# Patient Record
Sex: FEMALE | Race: WHITE | Employment: OTHER | URBAN - METROPOLITAN AREA
[De-identification: names, ages, dates, MRNs, and addresses within clinical notes are randomized per-mention and may not be internally consistent; named-entity substitution may affect disease eponyms.]

---

## 2017-06-20 ENCOUNTER — HOSPITAL ENCOUNTER (OUTPATIENT)
Dept: WOMENS IMAGING | Age: 47
Discharge: HOME | End: 2017-06-20
Attending: INTERNAL MEDICINE | Admitting: INTERNAL MEDICINE

## 2017-06-20 DIAGNOSIS — Z12.31 VISIT FOR SCREENING MAMMOGRAM: ICD-10-CM

## 2019-07-18 ENCOUNTER — HOSPITAL ENCOUNTER (EMERGENCY)
Age: 49
Discharge: HOME | End: 2019-07-18
Attending: EMERGENCY MEDICINE
Payer: COMMERCIAL

## 2019-07-18 ENCOUNTER — APPOINTMENT (OUTPATIENT)
Dept: GENERAL RADIOLOGY | Age: 49
End: 2019-07-18
Payer: COMMERCIAL

## 2019-07-18 VITALS
HEIGHT: 66 IN | HEART RATE: 74 BPM | TEMPERATURE: 98.1 F | RESPIRATION RATE: 16 BRPM | DIASTOLIC BLOOD PRESSURE: 60 MMHG | WEIGHT: 192.9 LBS | BODY MASS INDEX: 31 KG/M2 | SYSTOLIC BLOOD PRESSURE: 108 MMHG | OXYGEN SATURATION: 94 %

## 2019-07-18 DIAGNOSIS — S89.90XA INJURY OF LOWER EXTREMITY, UNSPECIFIED LATERALITY, INITIAL ENCOUNTER: ICD-10-CM

## 2019-07-18 DIAGNOSIS — T14.8XXA ABRASION: Primary | ICD-10-CM

## 2019-07-18 DIAGNOSIS — S89.90XA KNEE INJURY, UNSPECIFIED LATERALITY, INITIAL ENCOUNTER: ICD-10-CM

## 2019-07-18 PROCEDURE — 99283 EMERGENCY DEPT VISIT LOW MDM: CPT

## 2019-07-18 PROCEDURE — 73562 X-RAY EXAM OF KNEE 3: CPT

## 2019-07-18 PROCEDURE — 73610 X-RAY EXAM OF ANKLE: CPT

## 2019-07-18 PROCEDURE — 73630 X-RAY EXAM OF FOOT: CPT

## 2019-07-18 PROCEDURE — 6370000000 HC RX 637 (ALT 250 FOR IP): Performed by: EMERGENCY MEDICINE

## 2019-07-18 RX ORDER — BACITRACIN, NEOMYCIN, POLYMYXIN B 400; 3.5; 5 [USP'U]/G; MG/G; [USP'U]/G
OINTMENT TOPICAL ONCE
Status: COMPLETED | OUTPATIENT
Start: 2019-07-18 | End: 2019-07-18

## 2019-07-18 RX ORDER — CEPHALEXIN 500 MG/1
500 CAPSULE ORAL 3 TIMES DAILY
Qty: 21 CAPSULE | Refills: 0 | Status: SHIPPED | OUTPATIENT
Start: 2019-07-18 | End: 2019-07-25

## 2019-07-18 RX ADMIN — BACITRACIN ZINC, NEOMYCIN SULFATE, AND POLYMYXIN B SULFATE: 400; 3.5; 5 OINTMENT TOPICAL at 10:05

## 2019-07-18 ASSESSMENT — PAIN SCALES - GENERAL
PAINLEVEL_OUTOF10: 4
PAINLEVEL_OUTOF10: 6

## 2019-07-18 ASSESSMENT — PAIN - FUNCTIONAL ASSESSMENT
PAIN_FUNCTIONAL_ASSESSMENT: ACTIVITIES ARE NOT PREVENTED
PAIN_FUNCTIONAL_ASSESSMENT: ACTIVITIES ARE NOT PREVENTED

## 2019-07-18 ASSESSMENT — ENCOUNTER SYMPTOMS
ABDOMINAL DISTENTION: 0
EYE REDNESS: 0
PHOTOPHOBIA: 0
EYE ITCHING: 0
COUGH: 0
EYE DISCHARGE: 0
APNEA: 0
CHOKING: 0
STRIDOR: 0
WHEEZING: 0
SHORTNESS OF BREATH: 0
CHEST TIGHTNESS: 0
EYE PAIN: 0

## 2019-07-18 ASSESSMENT — PAIN DESCRIPTION - PAIN TYPE
TYPE: ACUTE PAIN
TYPE: ACUTE PAIN

## 2019-07-18 ASSESSMENT — PAIN DESCRIPTION - ORIENTATION
ORIENTATION: LEFT
ORIENTATION: LEFT

## 2019-07-18 ASSESSMENT — PAIN DESCRIPTION - LOCATION
LOCATION: LEG
LOCATION: LEG

## 2019-07-18 ASSESSMENT — PAIN DESCRIPTION - ONSET
ONSET: ON-GOING
ONSET: ON-GOING

## 2019-07-18 ASSESSMENT — PAIN DESCRIPTION - DESCRIPTORS
DESCRIPTORS: BURNING
DESCRIPTORS: BURNING

## 2019-07-18 ASSESSMENT — PAIN DESCRIPTION - PROGRESSION
CLINICAL_PROGRESSION: GRADUALLY WORSENING
CLINICAL_PROGRESSION: GRADUALLY WORSENING

## 2019-07-18 ASSESSMENT — PAIN DESCRIPTION - FREQUENCY: FREQUENCY: CONTINUOUS

## 2019-07-18 NOTE — ED NOTES
D/C instructions, including RX and follow up care given to pt. Pt verbalized understanding and denies further questions or needs at this time. Ambulatory to waiting room with steady gait. NAD. Donis Lombard, RN  07/18/19 1016

## 2019-07-30 ENCOUNTER — HOSPITAL ENCOUNTER (OUTPATIENT)
Dept: WOUND CARE | Age: 49
Discharge: HOME | End: 2019-07-30
Payer: COMMERCIAL

## 2019-07-30 VITALS
SYSTOLIC BLOOD PRESSURE: 112 MMHG | RESPIRATION RATE: 16 BRPM | BODY MASS INDEX: 30.82 KG/M2 | TEMPERATURE: 97.8 F | DIASTOLIC BLOOD PRESSURE: 76 MMHG | HEIGHT: 66 IN | HEART RATE: 65 BPM | WEIGHT: 191.8 LBS

## 2019-07-30 DIAGNOSIS — S81.002A UNSPECIFIED OPEN WOUND, LEFT KNEE, INITIAL ENCOUNTER: ICD-10-CM

## 2019-07-30 PROCEDURE — 99214 OFFICE O/P EST MOD 30 MIN: CPT

## 2019-07-30 PROCEDURE — 99203 OFFICE O/P NEW LOW 30 MIN: CPT | Performed by: NURSE PRACTITIONER

## 2019-07-30 RX ORDER — LIDOCAINE 50 MG/G
OINTMENT TOPICAL PRN
Status: DISCONTINUED | OUTPATIENT
Start: 2019-07-30 | End: 2019-07-31 | Stop reason: HOSPADM

## 2019-07-30 RX ORDER — FLUOXETINE HYDROCHLORIDE 20 MG/1
20 CAPSULE ORAL DAILY
COMMUNITY

## 2019-07-30 RX ORDER — ALBUTEROL SULFATE 90 UG/1
2 AEROSOL, METERED RESPIRATORY (INHALATION) EVERY 6 HOURS PRN
COMMUNITY

## 2019-07-30 RX ORDER — COVID-19 ANTIGEN TEST
KIT MISCELLANEOUS PRN
COMMUNITY

## 2019-07-30 RX ORDER — IBUPROFEN 200 MG
200 TABLET ORAL EVERY 6 HOURS PRN
COMMUNITY

## 2019-07-30 RX ORDER — MONTELUKAST SODIUM 10 MG/1
10 TABLET ORAL NIGHTLY
COMMUNITY

## 2019-07-30 RX ORDER — CLONAZEPAM 0.5 MG/1
0.5 TABLET ORAL DAILY PRN
COMMUNITY

## 2019-07-30 ASSESSMENT — PAIN - FUNCTIONAL ASSESSMENT
PAIN_FUNCTIONAL_ASSESSMENT: PREVENTS OR INTERFERES SOME ACTIVE ACTIVITIES AND ADLS
PAIN_FUNCTIONAL_ASSESSMENT: PREVENTS OR INTERFERES WITH MANY ACTIVE NOT PASSIVE ACTIVITIES

## 2019-07-30 ASSESSMENT — PAIN DESCRIPTION - FREQUENCY
FREQUENCY: INTERMITTENT
FREQUENCY: INTERMITTENT

## 2019-07-30 ASSESSMENT — PAIN DESCRIPTION - PAIN TYPE
TYPE: ACUTE PAIN
TYPE: ACUTE PAIN

## 2019-07-30 ASSESSMENT — PAIN SCALES - GENERAL
PAINLEVEL_OUTOF10: 3
PAINLEVEL_OUTOF10: 1

## 2019-07-30 ASSESSMENT — PAIN DESCRIPTION - ONSET
ONSET: ON-GOING
ONSET: ON-GOING

## 2019-07-30 ASSESSMENT — PAIN DESCRIPTION - DESCRIPTORS
DESCRIPTORS: BURNING
DESCRIPTORS: BURNING

## 2019-07-30 ASSESSMENT — PAIN DESCRIPTION - ORIENTATION
ORIENTATION: LEFT
ORIENTATION: LEFT

## 2019-07-30 ASSESSMENT — PAIN DESCRIPTION - LOCATION
LOCATION: FOOT
LOCATION: FOOT

## 2019-07-30 ASSESSMENT — PAIN DESCRIPTION - PROGRESSION
CLINICAL_PROGRESSION: NOT CHANGED
CLINICAL_PROGRESSION: NOT CHANGED

## 2019-07-31 PROBLEM — S81.002A UNSPECIFIED OPEN WOUND, LEFT KNEE, INITIAL ENCOUNTER: Status: ACTIVE | Noted: 2019-07-31

## 2019-07-31 NOTE — PROGRESS NOTES
Assessment Painful;Pink 7/30/2019  8:34 AM   Non-staged Wound Description Full thickness 7/30/2019  8:34 AM   Other%Wound Bed 100 7/30/2019  8:34 AM   Number of days: 1          Plan:   Pt education per provider related to concept of moist wound healing and the goals of treatment with using Medihoney for next week. Pt is in agreement with plan of care and questions answered. Treatment Note please see attached Discharge Instructions    Written patient dismissal instructions given to patient and signed by patient or POA. Discharge [address] and Hyperbaric Oxygen Therapy   Physician Orders and Discharge Instructions  [facility]  [address], [address]  Telephone: [phone]    NAME:  Sissy Jacques  YOB: 1970  MEDICAL RECORD NUMBER:  [id number]  DATE:  7/30/2019    Wound Cleansing:   Do not scrub or use excessive force. Cleanse wound prior to applying a clean dressing with:  [x] Normal Saline [x] Keep Wound Dry in Shower            Topical Treatments:  Do not apply lotions, creams, or ointments to wound bed unless directed. [] Apply moisturizing lotion to skin surrounding the wound prior to dressing change.  [] Apply antifungal ointment to skin surrounding the wound prior to dressing change.  [] Apply thin film of moisture barrier ointment to skin immediately around wound. [] Other:       Dressings:           Wound Location  LEFT KNEE/LEFT DORSAL FOOT      [x] Apply Primary Dressing:       [x] MediHoney Gel        ** SANTYL TODAY IN CLINIC ONLY**    [x] Cover and Secure with:     [x] Gauze [x] Sandra: TO FOOT WOUNDS    [X] COVER ROLL  TO LEFT KNEE     Avoid contact of tape with skin.     [x] Change dressing: [x] Daily           Edema Control:  Apply: [] Compression Stocking []Right Leg []Left Leg   [] Tubigrip []Right Leg Double Layer []Left Leg Double Layer

## 2019-08-05 ENCOUNTER — HOSPITAL ENCOUNTER (OUTPATIENT)
Dept: WOUND CARE | Age: 49
Discharge: HOME | End: 2019-08-05
Payer: COMMERCIAL

## 2019-08-05 VITALS
RESPIRATION RATE: 18 BRPM | TEMPERATURE: 97.6 F | HEART RATE: 100 BPM | SYSTOLIC BLOOD PRESSURE: 105 MMHG | DIASTOLIC BLOOD PRESSURE: 72 MMHG

## 2019-08-05 DIAGNOSIS — S91.302D UNSPECIFIED OPEN WOUND, LEFT FOOT, SUBSEQUENT ENCOUNTER: ICD-10-CM

## 2019-08-05 DIAGNOSIS — S81.802D OPEN WOUND, LOWER LEG, LEFT, SUBSEQUENT ENCOUNTER: ICD-10-CM

## 2019-08-05 DIAGNOSIS — S81.002D UNSPECIFIED OPEN WOUND, LEFT KNEE, SUBSEQUENT ENCOUNTER: Primary | ICD-10-CM

## 2019-08-05 PROCEDURE — 11042 DBRDMT SUBQ TIS 1ST 20SQCM/<: CPT

## 2019-08-05 PROCEDURE — 11042 DBRDMT SUBQ TIS 1ST 20SQCM/<: CPT | Performed by: NURSE PRACTITIONER

## 2019-08-05 PROCEDURE — 11045 DBRDMT SUBQ TISS EACH ADDL: CPT

## 2019-08-05 PROCEDURE — 11045 DBRDMT SUBQ TISS EACH ADDL: CPT | Performed by: NURSE PRACTITIONER

## 2019-08-05 ASSESSMENT — PAIN DESCRIPTION - ONSET
ONSET: PROGRESSIVE
ONSET: ON-GOING

## 2019-08-05 ASSESSMENT — PAIN DESCRIPTION - FREQUENCY
FREQUENCY: INTERMITTENT
FREQUENCY: INTERMITTENT

## 2019-08-05 ASSESSMENT — PAIN DESCRIPTION - LOCATION
LOCATION: FOOT
LOCATION: FOOT

## 2019-08-05 ASSESSMENT — PAIN DESCRIPTION - PAIN TYPE
TYPE: ACUTE PAIN
TYPE: ACUTE PAIN

## 2019-08-05 ASSESSMENT — PAIN DESCRIPTION - PROGRESSION
CLINICAL_PROGRESSION: NOT CHANGED
CLINICAL_PROGRESSION: NOT CHANGED

## 2019-08-05 ASSESSMENT — PAIN DESCRIPTION - ORIENTATION
ORIENTATION: LEFT
ORIENTATION: LEFT

## 2019-08-05 ASSESSMENT — PAIN DESCRIPTION - DESCRIPTORS
DESCRIPTORS: BURNING
DESCRIPTORS: BURNING

## 2019-08-05 ASSESSMENT — PAIN SCALES - GENERAL
PAINLEVEL_OUTOF10: 5
PAINLEVEL_OUTOF10: 4

## 2019-08-05 ASSESSMENT — PAIN - FUNCTIONAL ASSESSMENT
PAIN_FUNCTIONAL_ASSESSMENT: PREVENTS OR INTERFERES SOME ACTIVE ACTIVITIES AND ADLS
PAIN_FUNCTIONAL_ASSESSMENT: PREVENTS OR INTERFERES SOME ACTIVE ACTIVITIES AND ADLS

## 2019-08-06 RX ORDER — LIDOCAINE HYDROCHLORIDE 40 MG/ML
SOLUTION TOPICAL ONCE
Status: DISCONTINUED | OUTPATIENT
Start: 2019-08-06 | End: 2019-08-19

## 2019-08-07 PROBLEM — S81.002D: Status: ACTIVE | Noted: 2019-07-31

## 2019-08-07 PROBLEM — S81.802D OPEN WOUND, LOWER LEG, LEFT, SUBSEQUENT ENCOUNTER: Status: ACTIVE | Noted: 2019-08-07

## 2019-08-07 PROBLEM — S91.302D UNSPECIFIED OPEN WOUND, LEFT FOOT, SUBSEQUENT ENCOUNTER: Status: ACTIVE | Noted: 2019-08-07

## 2019-08-07 NOTE — PROGRESS NOTES
2005    CYST REMOVAL      EYE SURGERY  EYELID LIFT    HYSTERECTOMY  2011    KNEE ARTHROSCOPY Left [id number]     ARTHROSCOPIC LEFT KNEE ANTERIOR CRUCIATE LIGAMENT    SHOULDER ARTHROSCOPY Left FROZEN    SHOULDER SURGERY         FAMILY HISTORY    Family History   Problem Relation Age of Onset    Diabetes Father     Osteoarthritis Father     High Blood Pressure Father     High Cholesterol Father     Coronary Art Dis Father     Cancer Father         liver    Depression Father     Arthritis Father     Osteoarthritis Mother     Diabetes Mother     Depression Mother     Arthritis Mother     Seizures Other         Son    Diabetes Maternal Grandmother     Coronary Art Dis Maternal Grandfather     Diabetes Maternal Grandfather        SOCIAL HISTORY    Social History     Tobacco Use    Smoking status: Never Smoker    Smokeless tobacco: Never Used   Substance Use Topics    Alcohol use: Yes     Alcohol/week: 3.0 standard drinks     Types: 3 Cans of beer per week     Comment: RARELY    Drug use: No       ALLERGIES    Allergies   Allergen Reactions    Codeine Other (See Comments)     Sharp abdominal pain    Hydrocodone-Acetaminophen      Causes pt severe abd pain    Oxycodone-Acetaminophen Other (See Comments)     Sharp abdominal pain    Clindamycin/Lincomycin Rash    Doxycycline Rash       MEDICATIONS    Current Outpatient Medications on File Prior to Encounter   Medication Sig Dispense Refill    FLUoxetine (PROZAC) 20 MG capsule Take 20 mg by mouth daily 2 capsules      clonazePAM (KLONOPIN) 0.5 MG tablet Take 0.5 mg by mouth daily as needed.       ibuprofen (ADVIL;MOTRIN) 200 MG tablet Take 200 mg by mouth every 6 hours as needed for Pain      Naproxen Sodium (ALEVE) 220 MG CAPS Take by mouth as needed for Pain      Wound Dressings ([facility] WOUND/BURN DRESSING) GEL gel Apply 1 each topically daily 1 Tube 0    albuterol sulfate  (90 Base) MCG/ACT inhaler Inhale 2 puffs into the lungs necrotic/eschar    Pre Debridement Measurements:  Are located in the [place]  Documentation Flow Sheet+    Wound/Ulcer #: 1, 2, 3 and 4    Post Debridement Measurements:  Wound/Ulcer Descriptions are Pre Debridement except measurements:    Incision 10/14/13 Knee Left (Active)   Number of days: 2122       Wound 07/30/19 Knee Left #1 - injury 7/18/2019 (Active)   Wound Image   8/5/2019  5:18 PM   Wound Traumatic 7/30/2019  8:34 AM   Dressing Status Old drainage 8/5/2019  4:16 PM   Dressing Changed Changed/New 8/5/2019  5:18 PM   Dressing/Treatment Collagen;Dry Dressing 8/5/2019  5:18 PM   Wound Length (cm) 5.6 cm 8/5/2019  4:16 PM   Wound Width (cm) 4.1 cm 8/5/2019  4:16 PM   Wound Depth (cm) 0.1 cm 8/5/2019  4:16 PM   Wound Surface Area (cm^2) 22.96 cm^2 8/5/2019  4:16 PM   Change in Wound Size % (l*w) -4.36 8/5/2019  4:16 PM   Wound Volume (cm^3) 2.3 cm^3 8/5/2019  4:16 PM   Wound Healing % -5 8/5/2019  4:16 PM   Post-Procedure Length (cm) 5.6 cm 8/5/2019  5:09 PM   Post-Procedure Width (cm) 4.1 cm 8/5/2019  5:09 PM   Post-Procedure Depth (cm) 0.1 cm 8/5/2019  5:09 PM   Post-Procedure Surface Area (cm^2) 22.96 cm^2 8/5/2019  5:09 PM   Post-Procedure Volume (cm^3) 2.3 cm^3 8/5/2019  5:09 PM   Wound Assessment Dry;Brown;Granulation tissue 8/5/2019  4:16 PM   Drainage Amount Small 8/5/2019  4:16 PM   Drainage Description Serosanguinous 8/5/2019  4:16 PM   Odor None 8/5/2019  4:16 PM   Margins Undefined edges 8/5/2019  4:16 PM   Luciana-wound Assessment Pink;Dry 8/5/2019  4:16 PM   Non-staged Wound Description Full thickness 8/5/2019  4:16 PM   [place]%Wound Bed 5 8/5/2019  4:16 PM   Other%Wound Bed 95 8/5/2019  4:16 PM   Number of days: 8       Wound 07/30/19 Foot Left;Dorsal;Proximal #2 - injury 7/18/2019 (Active)   Wound Image   8/5/2019  5:18 PM   Wound Traumatic 7/30/2019  8:34 AM   Dressing Status Old drainage 8/5/2019  4:16 PM   Dressing Changed Changed/New 8/5/2019  5:18 PM   Dressing/Treatment Collagen;Dry

## 2019-08-12 ENCOUNTER — HOSPITAL ENCOUNTER (OUTPATIENT)
Dept: WOUND CARE | Age: 49
Discharge: HOME | End: 2019-08-12
Payer: COMMERCIAL

## 2019-08-12 VITALS — TEMPERATURE: 98.1 F | DIASTOLIC BLOOD PRESSURE: 85 MMHG | SYSTOLIC BLOOD PRESSURE: 123 MMHG | HEART RATE: 70 BPM

## 2019-08-12 DIAGNOSIS — S81.802D OPEN WOUND, LOWER LEG, LEFT, SUBSEQUENT ENCOUNTER: Primary | ICD-10-CM

## 2019-08-12 PROCEDURE — 99212 OFFICE O/P EST SF 10 MIN: CPT | Performed by: NURSE PRACTITIONER

## 2019-08-12 PROCEDURE — 99212 OFFICE O/P EST SF 10 MIN: CPT

## 2019-08-12 RX ORDER — BETAMETHASONE DIPROPIONATE 0.05 %
OINTMENT (GRAM) TOPICAL ONCE
Status: CANCELLED | OUTPATIENT
Start: 2019-08-12

## 2019-08-12 RX ORDER — GENTAMICIN SULFATE 1 MG/G
OINTMENT TOPICAL ONCE
Status: CANCELLED | OUTPATIENT
Start: 2019-08-12

## 2019-08-12 RX ORDER — BACITRACIN, NEOMYCIN, POLYMYXIN B 400; 3.5; 5 [USP'U]/G; MG/G; [USP'U]/G
OINTMENT TOPICAL ONCE
Status: CANCELLED | OUTPATIENT
Start: 2019-08-12

## 2019-08-12 RX ORDER — GINSENG 100 MG
CAPSULE ORAL ONCE
Status: CANCELLED | OUTPATIENT
Start: 2019-08-12

## 2019-08-12 RX ORDER — BACITRACIN ZINC AND POLYMYXIN B SULFATE 500; 1000 [USP'U]/G; [USP'U]/G
OINTMENT TOPICAL ONCE
Status: CANCELLED | OUTPATIENT
Start: 2019-08-12

## 2019-08-12 RX ORDER — CLOBETASOL PROPIONATE 0.5 MG/G
OINTMENT TOPICAL ONCE
Status: CANCELLED | OUTPATIENT
Start: 2019-08-12

## 2019-08-12 ASSESSMENT — PAIN SCALES - GENERAL: PAINLEVEL_OUTOF10: 0

## 2019-08-14 NOTE — PROGRESS NOTES
3:29 PM   Wound Depth (cm) 0.1 cm 8/12/2019  3:29 PM   Wound Surface Area (cm^2) 0.55 cm^2 8/12/2019  3:29 PM   Change in Wound Size % (l*w) 84.38 8/12/2019  3:29 PM   Wound Volume (cm^3) 0.06 cm^3 8/12/2019  3:29 PM   Wound Healing % 83 8/12/2019  3:29 PM   Post-Procedure Length (cm) 0.6 cm 8/12/2019  3:46 PM   Post-Procedure Width (cm) 1.2 cm 8/12/2019  3:46 PM   Post-Procedure Depth (cm) 0.1 cm 8/12/2019  3:46 PM   Post-Procedure Surface Area (cm^2) 0.72 cm^2 8/12/2019  3:46 PM   Post-Procedure Volume (cm^3) 0.07 cm^3 8/12/2019  3:46 PM   Wound Assessment Black 8/12/2019  3:29 PM   Drainage Amount None 8/12/2019  3:29 PM   Drainage Description Serosanguinous 8/5/2019  4:16 PM   Odor None 8/12/2019  3:29 PM   Margins Undefined edges 8/12/2019  3:29 PM   Luciana-wound Assessment Dry 8/12/2019  3:29 PM   Non-staged Wound Description Full thickness 8/12/2019  3:29 PM   [place]%Wound Bed 20 8/5/2019  4:16 PM   Black%Wound Bed 100 8/12/2019  3:29 PM   Other%Wound Bed 80 8/5/2019  4:16 PM   Number of days: 15          Plan:   Pt education per provider about care plan for last remaining wound. Pt agreeable to plan of care and questions answered. Treatment Note please see attached Discharge Instructions    Written patient dismissal instructions given to patient and signed by patient or POA. Discharge Instructions         Discharge Instructions               [address] and Hyperbaric Oxygen Therapy   Physician Orders and Discharge [address]  [address] [facility] [address], [facility] [address]  Telephone: [phone]      FAX [phone]     NAME: Adry Jacques  DATE OF BIRTH:  1970  MEDICAL RECORD NUMBER:  [id number]  DATE:  8/12/2019     Wound Cleansing:   Do not scrub or use excessive force.   Cleanse wound prior to applying a clean dressing with:  [x] Normal Saline            [x] Keep Wound Dry in Shower            Topical

## 2019-08-19 ENCOUNTER — HOSPITAL ENCOUNTER (OUTPATIENT)
Dept: WOUND CARE | Age: 49
Discharge: HOME | End: 2019-08-19
Payer: COMMERCIAL

## 2019-08-19 VITALS
DIASTOLIC BLOOD PRESSURE: 85 MMHG | RESPIRATION RATE: 18 BRPM | HEART RATE: 69 BPM | TEMPERATURE: 97.9 F | SYSTOLIC BLOOD PRESSURE: 129 MMHG

## 2019-08-19 DIAGNOSIS — S81.802D OPEN WOUND, LOWER LEG, LEFT, SUBSEQUENT ENCOUNTER: Primary | ICD-10-CM

## 2019-08-19 PROCEDURE — 99211 OFF/OP EST MAY X REQ PHY/QHP: CPT

## 2019-08-19 PROCEDURE — 99211 OFF/OP EST MAY X REQ PHY/QHP: CPT | Performed by: NURSE PRACTITIONER

## 2019-08-19 RX ORDER — LIDOCAINE HYDROCHLORIDE 40 MG/ML
SOLUTION TOPICAL ONCE
Status: DISCONTINUED | OUTPATIENT
Start: 2019-08-19 | End: 2019-08-20 | Stop reason: HOSPADM

## 2019-08-19 ASSESSMENT — PAIN SCALES - GENERAL: PAINLEVEL_OUTOF10: 0
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
## 2020-01-14 ENCOUNTER — HOSPITAL ENCOUNTER (OUTPATIENT)
Dept: WOMENS IMAGING | Age: 50
Discharge: HOME | End: 2020-01-14
Payer: COMMERCIAL

## 2020-01-14 PROCEDURE — 77063 BREAST TOMOSYNTHESIS BI: CPT

## 2020-10-06 ENCOUNTER — HOSPITAL ENCOUNTER (EMERGENCY)
Age: 50
Discharge: HOME | End: 2020-10-06
Attending: EMERGENCY MEDICINE
Payer: COMMERCIAL

## 2020-10-06 ENCOUNTER — APPOINTMENT (OUTPATIENT)
Dept: GENERAL RADIOLOGY | Age: 50
End: 2020-10-06
Payer: COMMERCIAL

## 2020-10-06 VITALS
SYSTOLIC BLOOD PRESSURE: 122 MMHG | BODY MASS INDEX: 30.65 KG/M2 | WEIGHT: 190.7 LBS | RESPIRATION RATE: 17 BRPM | OXYGEN SATURATION: 100 % | HEART RATE: 77 BPM | DIASTOLIC BLOOD PRESSURE: 81 MMHG | HEIGHT: 66 IN | TEMPERATURE: 97.7 F

## 2020-10-06 PROCEDURE — 6360000002 HC RX W HCPCS: Performed by: EMERGENCY MEDICINE

## 2020-10-06 PROCEDURE — 73140 X-RAY EXAM OF FINGER(S): CPT

## 2020-10-06 PROCEDURE — 99283 EMERGENCY DEPT VISIT LOW MDM: CPT

## 2020-10-06 PROCEDURE — 90715 TDAP VACCINE 7 YRS/> IM: CPT | Performed by: EMERGENCY MEDICINE

## 2020-10-06 PROCEDURE — 6370000000 HC RX 637 (ALT 250 FOR IP): Performed by: EMERGENCY MEDICINE

## 2020-10-06 PROCEDURE — 12002 RPR S/N/AX/GEN/TRNK2.6-7.5CM: CPT

## 2020-10-06 PROCEDURE — 90471 IMMUNIZATION ADMIN: CPT | Performed by: EMERGENCY MEDICINE

## 2020-10-06 PROCEDURE — 2500000003 HC RX 250 WO HCPCS: Performed by: EMERGENCY MEDICINE

## 2020-10-06 RX ORDER — CEPHALEXIN 500 MG/1
500 CAPSULE ORAL ONCE
Status: COMPLETED | OUTPATIENT
Start: 2020-10-06 | End: 2020-10-06

## 2020-10-06 RX ORDER — CEPHALEXIN 500 MG/1
500 CAPSULE ORAL 4 TIMES DAILY
Qty: 40 CAPSULE | Refills: 0 | Status: SHIPPED | OUTPATIENT
Start: 2020-10-06 | End: 2020-10-16

## 2020-10-06 RX ORDER — BACITRACIN, NEOMYCIN, POLYMYXIN B 400; 3.5; 5 [USP'U]/G; MG/G; [USP'U]/G
OINTMENT TOPICAL ONCE
Status: COMPLETED | OUTPATIENT
Start: 2020-10-06 | End: 2020-10-06

## 2020-10-06 RX ORDER — LIDOCAINE HYDROCHLORIDE 10 MG/ML
5 INJECTION, SOLUTION INFILTRATION; PERINEURAL ONCE
Status: COMPLETED | OUTPATIENT
Start: 2020-10-06 | End: 2020-10-06

## 2020-10-06 RX ORDER — HYDROCODONE BITARTRATE AND ACETAMINOPHEN 5; 325 MG/1; MG/1
1 TABLET ORAL EVERY 4 HOURS PRN
Qty: 18 TABLET | Refills: 0 | Status: SHIPPED | OUTPATIENT
Start: 2020-10-06 | End: 2020-10-09

## 2020-10-06 RX ADMIN — TETANUS TOXOID, REDUCED DIPHTHERIA TOXOID AND ACELLULAR PERTUSSIS VACCINE, ADSORBED 0.5 ML: 5; 2.5; 8; 8; 2.5 SUSPENSION INTRAMUSCULAR at 15:11

## 2020-10-06 RX ADMIN — LIDOCAINE HYDROCHLORIDE 5 ML: 10 INJECTION, SOLUTION INFILTRATION; PERINEURAL at 16:00

## 2020-10-06 RX ADMIN — CEPHALEXIN 500 MG: 500 CAPSULE ORAL at 15:13

## 2020-10-06 RX ADMIN — BACITRACIN ZINC, NEOMYCIN SULFATE, AND POLYMYXIN B SULFATE: 400; 3.5; 5 OINTMENT TOPICAL at 16:32

## 2020-10-06 ASSESSMENT — PAIN SCALES - GENERAL
PAINLEVEL_OUTOF10: 7
PAINLEVEL_OUTOF10: 7

## 2020-10-06 ASSESSMENT — PAIN DESCRIPTION - DESCRIPTORS: DESCRIPTORS: THROBBING

## 2020-10-06 ASSESSMENT — PAIN DESCRIPTION - LOCATION: LOCATION: FINGER (COMMENT WHICH ONE)

## 2020-10-06 NOTE — ED TRIAGE NOTES
Pt was trimming headges and accidentally cut left middle finger with electric hedge trimmers.  Pt not on blood thinners, bleeding controlled at this time,

## 2020-10-06 NOTE — ED PROVIDER NOTES
[address] [facility]      Pt Name: Emily Barrientos  MRN: [id number]  [place] 1970  Date of evaluation: 10/6/2020  Provider: Osman Seay [address] [place] [address]  Chief Complaint   Patient presents with    Laceration     left third finger cut with electric hedge trimmers       I wore personal protective equipment when I was in the room the entire time. This includes gloves, N95 mask, face shield, and a glove over my stethoscope for protection. HPI  Emily Barrientos is a 44 y.o. female who presents with laceration the tip of her left middle finger. She is left-handed. She states it occurred when she was working with a hedge tremor that would not shut off unless she unplugged it. It caught her distal left middle finger. She sustained a laceration at that time. She denies any other injuries. She states her tetanus is not up-to-date. She states that is not numb and that hurts. She denies any radiation of her pain. REVIEW OF SYSTEMS  All systems negative except as noted in the HPI. Reviewed Nurses' notes and concur. No LMP recorded. Patient has had a hysterectomy.     PAST MEDICAL HISTORY  Past Medical History:   Diagnosis Date    Anxiety     Arthritis     Asthma     Depression     DVT of lower extremity (deep venous thrombosis) [facility]) 14 Nov 2013    Fibromyalgia     Fibromyalgia     Gastroesophageal reflux 2004    History of migraine headaches     Hx of blood clots     Low back pain     Neck pain     Thyroid disease     Hashimoto's Disease       FAMILY HISTORY  Family History   Problem Relation Age of Onset    Diabetes Father     Osteoarthritis Father     High Blood Pressure Father     High Cholesterol Father     Coronary Art Dis Father     Cancer Father         liver    Depression Father     Arthritis Father     Osteoarthritis Mother     Diabetes Mother     Depression Mother     Arthritis Mother  Seizures Other         Son    Diabetes Maternal Grandmother     Coronary Art Dis Maternal Grandfather     Diabetes Maternal Grandfather        SOCIAL HISTORY   reports that she has never smoked. She has never used smokeless tobacco. She reports current alcohol use of about 3.0 standard drinks of alcohol per week. She reports that she does not use drugs. SURGICAL HISTORY  Past Surgical History:   Procedure Laterality Date    ANTERIOR CRUCIATE LIGAMENT REPAIR  2013    CERVICAL [address] SURGERY      CHOLECYSTECTOMY  2005    CYST REMOVAL      EYE SURGERY  EYELID LIFT    HYSTERECTOMY  2011    KNEE ARTHROSCOPY Left [id number]     ARTHROSCOPIC LEFT KNEE ANTERIOR CRUCIATE LIGAMENT    SHOULDER ARTHROSCOPY Left FROZEN    SHOULDER SURGERY         CURRENT MEDICATIONS  Current Outpatient Rx   Medication Sig Dispense Refill    cephALEXin (KEFLEX) 500 MG capsule Take 1 capsule by mouth 4 times daily for 10 days 40 capsule 0    HYDROcodone-acetaminophen (NORCO) 5-325 MG per tablet Take 1 tablet by mouth every 4 hours as needed for Pain for up to 3 days. Intended supply: 3 days. Take lowest dose possible to manage pain 18 tablet 0    Pseudoephedrine HCl 30 MG/5ML LIQD Take by mouth      FLUoxetine (PROZAC) 20 MG capsule Take 20 mg by mouth daily 2 capsules      albuterol sulfate  (90 Base) MCG/ACT inhaler Inhale 2 puffs into the lungs every 6 hours as needed for Wheezing      montelukast (SINGULAIR) 10 MG tablet Take 10 mg by mouth nightly      clonazePAM (KLONOPIN) 0.5 MG tablet Take 0.5 mg by mouth daily as needed.       ibuprofen (ADVIL;MOTRIN) 200 MG tablet Take 200 mg by mouth every 6 hours as needed for Pain      Naproxen Sodium (ALEVE) 220 MG CAPS Take by mouth as needed for Pain      Wound Dressings ([facility] WOUND/BURN DRESSING) GEL gel Apply 1 each topically daily 1 Tube 0       ALLERGIES  Allergies   Allergen Reactions    Codeine Other (See Comments)     Sharp abdominal pain    department. She was instructed to continue that at home. She was instructed return if she found any signs of infection. Patient was given laceration instructions. She was instructed not to start changing bandages for least 2 days. The patient's blood pressure was found to be elevated according to CMS/Medicare and the Affordable Care Act/[facility] criteria. Elevated blood pressure could occur because of pain or anxiety or other reasons and does not mean that they need to have their blood pressure treated or medications otherwise adjusted. However, this could also be a sign that they will need to have their blood pressure treated or medications changed. The patient was instructed to follow up closely with their personal physician to have their blood pressure rechecked. The patient was instructed to take a list of recent blood pressure readings to their next visit with their personal physician. See discharge instructions for specific medications, discharge information, and treatments. They were verbally instructed to return to emergency if any problems. (This chart has been completed using [address]. Although attempts have been made to ensure accuracy, words and/or phrases may not be transcribed as intended.)    Patient refused pain medicines at the time of their exam.    IMPRESSION(S):  1. Open displaced fracture of distal phalanx of left middle finger, initial encounter        Diagnostic considerations include but are not limited to: Arterial Injury/Ischemia, Fracture, Dislocation, Infection, Neurologic Deficit/Injury.            Rafaela De Los Santos DO  10/06/20 0120

## 2020-10-07 ENCOUNTER — OFFICE VISIT (OUTPATIENT)
Dept: ORTHOPEDIC SURGERY | Age: 50
End: 2020-10-07
Payer: COMMERCIAL

## 2020-10-07 PROBLEM — S62.633B OPEN DISPLACED FRACTURE OF DISTAL PHALANX OF LEFT MIDDLE FINGER: Status: ACTIVE | Noted: 2020-10-07

## 2020-10-07 PROCEDURE — G8417 CALC BMI ABV UP PARAM F/U: HCPCS | Performed by: ORTHOPAEDIC SURGERY

## 2020-10-07 PROCEDURE — 26750 TREAT FINGER FRACTURE EACH: CPT | Performed by: ORTHOPAEDIC SURGERY

## 2020-10-07 PROCEDURE — G8427 DOCREV CUR MEDS BY ELIG CLIN: HCPCS | Performed by: ORTHOPAEDIC SURGERY

## 2020-10-07 PROCEDURE — 99203 OFFICE O/P NEW LOW 30 MIN: CPT | Performed by: ORTHOPAEDIC SURGERY

## 2020-10-07 PROCEDURE — 3017F COLORECTAL CA SCREEN DOC REV: CPT | Performed by: ORTHOPAEDIC SURGERY

## 2020-10-07 PROCEDURE — G8484 FLU IMMUNIZE NO ADMIN: HCPCS | Performed by: ORTHOPAEDIC SURGERY

## 2020-10-07 PROCEDURE — 1036F TOBACCO NON-USER: CPT | Performed by: ORTHOPAEDIC SURGERY

## 2020-10-07 NOTE — PROGRESS NOTES
CHIEF COMPLAINT: Left hand pain/ long (middle) finger distal phalanx open fracture. DATE OF INJURY: 10/6/2020    HISTORY:  Ms. Ronnell Gallo 44 y.o.  female left handed presents today for the first visit for evaluation of a left hand injury which occurred when she was getting a friends bushes with a hedge tremors and accidentally cut her finger. She is complaining of long (middle) finger pain and swelling. This is better with elevation and worse with ROM. The pain is sharp and not radiating. No other complaint. She rates her pain a 8/10 VAS. She was seen at [facility] ER, was evaluated, started on Keflex, sutured and splinted and asked to see orthopedics. Denies smoking.     Past Medical History:   Diagnosis Date    Anxiety     Arthritis     Asthma     Depression     DVT of lower extremity (deep venous thrombosis) [facility]) 14 Nov 2013    Fibromyalgia     Fibromyalgia     Gastroesophageal reflux 2004    History of migraine headaches     Hx of blood clots     Low back pain     Neck pain     Thyroid disease     Hashimoto's Disease       Past Surgical History:   Procedure Laterality Date    ANTERIOR CRUCIATE LIGAMENT REPAIR  2013    CERVICAL [address] SURGERY      CHOLECYSTECTOMY  2005    CYST REMOVAL      EYE SURGERY  EYELID LIFT    HYSTERECTOMY  2011    KNEE ARTHROSCOPY Left [id number]     ARTHROSCOPIC LEFT KNEE ANTERIOR CRUCIATE LIGAMENT    SHOULDER ARTHROSCOPY Left FROZEN    SHOULDER SURGERY         Social History     Socioeconomic History    Marital status:      Spouse name: Not on file    Number of children: 1    Years of education: Not on file    Highest education level: Not on file   Occupational History    Not on file   Social Needs    Financial resource strain: Not on file    Food insecurity     Worry: Not on file     Inability: Not on file    Transportation needs     Medical: Not on file     Non-medical: Not on file   Tobacco Use    Smoking status: Never Smoker    Smokeless tobacco: Never Used   Substance and Sexual Activity    Alcohol use: Yes     Alcohol/week: 3.0 standard drinks     Types: 3 Cans of beer per week     Comment: RARELY    Drug use: No    Sexual activity: Not on file   Lifestyle    Physical activity     Days per week: Not on file     Minutes per session: Not on file    Stress: Not on file   Relationships    Social connections     Talks on phone: Not on file     Gets together: Not on file     Attends [employer] service: Not on file     Active member of club or organization: Not on file     Attends meetings of clubs or organizations: Not on file     Relationship status: Not on file    Intimate partner violence     Fear of current or ex partner: Not on file     Emotionally abused: Not on file     Physically abused: Not on file     Forced sexual activity: Not on file   Other Topics Concern    Not on file   Social History Narrative    Not on file       Family History   Problem Relation Age of Onset    Diabetes Father     Osteoarthritis Father     High Blood Pressure Father     High Cholesterol Father     Coronary Art Dis Father     Cancer Father         liver    Depression Father     Arthritis Father     Osteoarthritis Mother     Diabetes Mother     Depression Mother     Arthritis Mother     Seizures Other         Son    Diabetes Maternal Grandmother     Coronary Art Dis Maternal Grandfather     Diabetes Maternal Grandfather        Current Outpatient Medications on File Prior to Visit   Medication Sig Dispense Refill    cephALEXin (KEFLEX) 500 MG capsule Take 1 capsule by mouth 4 times daily for 10 days 40 capsule 0    HYDROcodone-acetaminophen (NORCO) 5-325 MG per tablet Take 1 tablet by mouth every 4 hours as needed for Pain for up to 3 days. Intended supply: 3 days.  Take lowest dose possible to manage pain 18 tablet 0    Pseudoephedrine HCl 30 MG/5ML LIQD Take by mouth      FLUoxetine (PROZAC) 20 MG capsule Take 20 mg by mouth daily 2 capsules      albuterol sulfate  (90 Base) MCG/ACT inhaler Inhale 2 puffs into the lungs every 6 hours as needed for Wheezing      montelukast (SINGULAIR) 10 MG tablet Take 10 mg by mouth nightly      clonazePAM (KLONOPIN) 0.5 MG tablet Take 0.5 mg by mouth daily as needed.  ibuprofen (ADVIL;MOTRIN) 200 MG tablet Take 200 mg by mouth every 6 hours as needed for Pain      Naproxen Sodium (ALEVE) 220 MG CAPS Take by mouth as needed for Pain      Wound Dressings ([facility] WOUND/BURN DRESSING) GEL gel Apply 1 each topically daily 1 Tube 0     No current facility-administered medications on file prior to visit. Pertinent items are noted in HPI  Review of systems reviewed from Patient History Form dated on 10/7/2020 and available in the patient's chart under the Media tab. No change noted. PHYSICAL EXAMINATION:  Ms. Tyesha Lowe is a very pleasant 44 y.o.  female who presents today in no acute distress, awake, alert, and oriented. She is well dressed, nourished and  groomed. Patient with normal affect. Height is   , weight is  , There is no height or weight on file to calculate BMI. Resting respiratory rate is 16. Examination of the gait, showed that the patient walks with no limp . Examination of both upper extremities showing a decreased range of motion of the left long (middle) finger compare to the other side. There is moderate swelling that can be seen, as well as ecchymosis of the long (middle) finger. Tuft of the long finger with a laceration that is sutured with open wound. She has intact sensation and good radial pulses. She has significant tenderness on deep palpation over the distal phalanx of the long (middle) finger left hand. There is no rotational deformity of the left long (middle) finger.                  IMAGING: Jade Blackman were reviewed, dated 10/6/2020 from Evelyn SANCHEZ,  3 views of the left hand, and showed a long (middle) finger distal phalanx minimally displaced fracture. IMPRESSION: Left hand long (middle) finger distal phalanx minimally displaced open fracture. PLAN:  I discussed that the overall alignment of this fracture is good and that we can try to treat this non-operatively in a soft dressing. We discussed the risk of nonunion and or malunion. For the open fracture, she will continue on the Keflex for at least 10 days. Dressing applied and instructed in care. We will see her  back in 1 week at which time we will get a new xray of the left hand and for wound check.         Minoo Oconnell MD

## 2020-10-14 ENCOUNTER — OFFICE VISIT (OUTPATIENT)
Dept: ORTHOPEDIC SURGERY | Age: 50
End: 2020-10-14

## 2020-10-14 VITALS — HEIGHT: 66 IN | TEMPERATURE: 97.2 F | BODY MASS INDEX: 30.53 KG/M2 | WEIGHT: 190 LBS

## 2020-10-14 PROCEDURE — 3017F COLORECTAL CA SCREEN DOC REV: CPT | Performed by: ORTHOPAEDIC SURGERY

## 2020-10-14 PROCEDURE — G8427 DOCREV CUR MEDS BY ELIG CLIN: HCPCS | Performed by: ORTHOPAEDIC SURGERY

## 2020-10-14 PROCEDURE — G8417 CALC BMI ABV UP PARAM F/U: HCPCS | Performed by: ORTHOPAEDIC SURGERY

## 2020-10-14 PROCEDURE — 1036F TOBACCO NON-USER: CPT | Performed by: ORTHOPAEDIC SURGERY

## 2020-10-14 PROCEDURE — G8484 FLU IMMUNIZE NO ADMIN: HCPCS | Performed by: ORTHOPAEDIC SURGERY

## 2020-10-14 PROCEDURE — 99024 POSTOP FOLLOW-UP VISIT: CPT | Performed by: ORTHOPAEDIC SURGERY

## 2020-10-14 NOTE — PROGRESS NOTES
CHIEF COMPLAINT: Left hand pain/ long (middle) finger distal phalanx open fracture. DATE OF INJURY: 10/6/2020    HISTORY:  Ms. Mason Mcdowell 44 y.o.  female left handed presents today for f/u evaluation of a left hand injury which occurred when she was getting a friends bushes with a hedge tremors and accidentally cut her finger. She is complaining of long (middle) finger pain and swelling. This is better with elevation and worse with ROM. The pain is sharp and not radiating. No other complaint. She rates her pain a 8/10 VAS. She was seen at [facility] ER, was evaluated, started on Keflex, sutured and splinted and asked to see orthopedics. Denies smoking.     Past Medical History:   Diagnosis Date    Anxiety     Arthritis     Asthma     Depression     DVT of lower extremity (deep venous thrombosis) [facility]) 14 Nov 2013    Fibromyalgia     Fibromyalgia     Gastroesophageal reflux 2004    History of migraine headaches     Hx of blood clots     Low back pain     Neck pain     Thyroid disease     Hashimoto's Disease       Past Surgical History:   Procedure Laterality Date    ANTERIOR CRUCIATE LIGAMENT REPAIR  2013    CERVICAL [address] SURGERY      CHOLECYSTECTOMY  2005    CYST REMOVAL      EYE SURGERY  EYELID LIFT    HYSTERECTOMY  2011    KNEE ARTHROSCOPY Left [id number]     ARTHROSCOPIC LEFT KNEE ANTERIOR CRUCIATE LIGAMENT    SHOULDER ARTHROSCOPY Left FROZEN    SHOULDER SURGERY         Social History     Socioeconomic History    Marital status:      Spouse name: Not on file    Number of children: 1    Years of education: Not on file    Highest education level: Not on file   Occupational History    Not on file   Social Needs    Financial resource strain: Not on file    Food insecurity     Worry: Not on file     Inability: Not on file    Transportation needs     Medical: Not on file     Non-medical: Not on file   Tobacco Use    Smoking status: Never Smoker    Smokeless tobacco: Never Used   Substance and Sexual Activity    Alcohol use:  Yes     Alcohol/week: 3.0 standard drinks     Types: 3 Cans of beer per week     Comment: RARELY    Drug use: No    Sexual activity: Not on file   Lifestyle    Physical activity     Days per week: Not on file     Minutes per session: Not on file    Stress: Not on file   Relationships    Social connections     Talks on phone: Not on file     Gets together: Not on file     Attends Alevism service: Not on file     Active member of club or organization: Not on file     Attends meetings of clubs or organizations: Not on file     Relationship status: Not on file    Intimate partner violence     Fear of current or ex partner: Not on file     Emotionally abused: Not on file     Physically abused: Not on file     Forced sexual activity: Not on file   Other Topics Concern    Not on file   Social History Narrative    Not on file       Family History   Problem Relation Age of Onset    Diabetes Father     Osteoarthritis Father     High Blood Pressure Father     High Cholesterol Father     Coronary Art Dis Father     Cancer Father         liver    Depression Father     Arthritis Father     Osteoarthritis Mother     Diabetes Mother     Depression Mother     Arthritis Mother     Seizures Other         Son    Diabetes Maternal Grandmother     Coronary Art Dis Maternal Grandfather     Diabetes Maternal Grandfather        Current Outpatient Medications on File Prior to Visit   Medication Sig Dispense Refill    cephALEXin (KEFLEX) 500 MG capsule Take 1 capsule by mouth 4 times daily for 10 days 40 capsule 0    Pseudoephedrine HCl 30 MG/5ML LIQD Take by mouth      FLUoxetine (PROZAC) 20 MG capsule Take 20 mg by mouth daily 2 capsules      albuterol sulfate  (90 Base) MCG/ACT inhaler Inhale 2 puffs into the lungs every 6 hours as needed for Wheezing      montelukast (SINGULAIR) 10 MG tablet Take 10 mg by mouth nightly      clonazePAM (KLONOPIN) 0.5 MG tablet Take 0.5 mg by mouth daily as needed.  ibuprofen (ADVIL;MOTRIN) 200 MG tablet Take 200 mg by mouth every 6 hours as needed for Pain      Naproxen Sodium (ALEVE) 220 MG CAPS Take by mouth as needed for Pain      Wound Dressings (MEDIHONEY WOUND/BURN DRESSING) GEL gel Apply 1 each topically daily 1 Tube 0     No current facility-administered medications on file prior to visit. Pertinent items are noted in HPI  Review of systems reviewed from Patient History Form dated on 10/7/2020 and available in the patient's chart under the Media tab. No change noted. PHYSICAL EXAMINATION:  Ms. Sudhir Lieberman is a very pleasant 44 y.o.  female who presents today in no acute distress, awake, alert, and oriented. She is well dressed, nourished and  groomed. Patient with normal affect. Height is  5' 6\" (1.676 m), weight is 190 lb (86.2 kg), Body mass index is 30.67 kg/m². Resting respiratory rate is 16. Examination of the gait, showed that the patient walks with no limp . Examination of both upper extremities showing a decreased range of motion of the left long (middle) finger compare to the other side. There is moderate swelling that can be seen, as well as ecchymosis of the long (middle) finger. Tuft of the long finger with a laceration that is sutured with open wound. She has intact sensation and good radial pulses. She has mild tenderness on deep palpation over the distal phalanx of the long (middle) finger left hand. There is no rotational deformity of the left long (middle) finger. IMAGING: Hilton Sutherland were reviewed, taken 10/14/2020 in the office,  3 views of the left hand, and showed a long (middle) finger distal phalanx minimally displaced fracture. IMPRESSION: Left hand long (middle) finger distal phalanx minimally displaced open fracture.     PLAN:  I discussed that the overall alignment of this fracture is good and that we can try to treat this

## 2020-10-23 ENCOUNTER — OFFICE VISIT (OUTPATIENT)
Dept: ORTHOPEDIC SURGERY | Age: 50
End: 2020-10-23

## 2020-10-23 VITALS — WEIGHT: 190 LBS | BODY MASS INDEX: 30.53 KG/M2 | TEMPERATURE: 97.6 F | HEIGHT: 66 IN

## 2020-10-23 PROCEDURE — 99024 POSTOP FOLLOW-UP VISIT: CPT | Performed by: ORTHOPAEDIC SURGERY

## 2020-10-23 PROCEDURE — G8484 FLU IMMUNIZE NO ADMIN: HCPCS | Performed by: ORTHOPAEDIC SURGERY

## 2020-10-23 PROCEDURE — G8417 CALC BMI ABV UP PARAM F/U: HCPCS | Performed by: ORTHOPAEDIC SURGERY

## 2020-10-23 PROCEDURE — G8427 DOCREV CUR MEDS BY ELIG CLIN: HCPCS | Performed by: ORTHOPAEDIC SURGERY

## 2020-10-23 PROCEDURE — 1036F TOBACCO NON-USER: CPT | Performed by: ORTHOPAEDIC SURGERY

## 2020-10-23 PROCEDURE — 3017F COLORECTAL CA SCREEN DOC REV: CPT | Performed by: ORTHOPAEDIC SURGERY

## 2020-10-23 NOTE — PROGRESS NOTES
CHIEF COMPLAINT: Left hand pain/ long (middle) finger distal phalanx open fracture. DATE OF INJURY: 10/6/2020    HISTORY:  Ms. Grayson Martínez 44 y.o.  female left handed presents today for f/u evaluation of a left hand injury which occurred when she was getting a friends bushes with a hedge tremors and accidentally cut her finger. She is complaining of long (middle) finger pain and swelling that is improving. This is better with elevation and worse with ROM. The pain is tender when touching it and throbbs and not radiating. No other complaint. She rates her pain a 6/10 VAS. She was seen at [facility] ER, was evaluated, started on Keflex, sutured and splinted and asked to see orthopedics. Denies smoking.     Past Medical History:   Diagnosis Date    Anxiety     Arthritis     Asthma     Depression     [facility] of lower extremity (deep venous thrombosis) [facility]) 14 Nov 2013    Fibromyalgia     Fibromyalgia     Gastroesophageal reflux 2004    History of migraine headaches     Hx of blood clots     Low back pain     Neck pain     Thyroid disease     Hashimoto's Disease       Past Surgical History:   Procedure Laterality Date    ANTERIOR CRUCIATE LIGAMENT REPAIR  2013    CERVICAL [address] SURGERY      CHOLECYSTECTOMY  2005    CYST REMOVAL      EYE SURGERY  EYELID LIFT    HYSTERECTOMY  2011    KNEE ARTHROSCOPY Left [id number]     ARTHROSCOPIC LEFT KNEE ANTERIOR CRUCIATE LIGAMENT    SHOULDER ARTHROSCOPY Left FROZEN    SHOULDER SURGERY         Social History     Socioeconomic History    Marital status:      Spouse name: Not on file    Number of children: 1    Years of education: Not on file    Highest education level: Not on file   Occupational History    Not on file   Social Needs    Financial resource strain: Not on file    Food insecurity     Worry: Not on file     Inability: Not on file    Transportation needs     Medical: Not on file     Non-medical: Not on file   Tobacco Use    Smoking status: Never Smoker    Smokeless tobacco: Never Used   Substance and Sexual Activity    Alcohol use:  Yes     Alcohol/week: 3.0 standard drinks     Types: 3 Cans of beer per week     Comment: RARELY    Drug use: No    Sexual activity: Not on file   Lifestyle    Physical activity     Days per week: Not on file     Minutes per session: Not on file    Stress: Not on file   Relationships    Social connections     Talks on phone: Not on file     Gets together: Not on file     Attends Voodoo service: Not on file     Active member of club or organization: Not on file     Attends meetings of clubs or organizations: Not on file     Relationship status: Not on file    Intimate partner violence     Fear of current or ex partner: Not on file     Emotionally abused: Not on file     Physically abused: Not on file     Forced sexual activity: Not on file   Other Topics Concern    Not on file   Social History Narrative    Not on file       Family History   Problem Relation Age of Onset    Diabetes Father     Osteoarthritis Father     High Blood Pressure Father     High Cholesterol Father     Coronary Art Dis Father     Cancer Father         liver    Depression Father     Arthritis Father     Osteoarthritis Mother     Diabetes Mother     Depression Mother     Arthritis Mother     Seizures Other         Son    Diabetes Maternal Grandmother     Coronary Art Dis Maternal Grandfather     Diabetes Maternal Grandfather        Current Outpatient Medications on File Prior to Visit   Medication Sig Dispense Refill    Pseudoephedrine HCl 30 MG/5ML LIQD Take by mouth      FLUoxetine (PROZAC) 20 MG capsule Take 20 mg by mouth daily 2 capsules      albuterol sulfate  (90 Base) MCG/ACT inhaler Inhale 2 puffs into the lungs every 6 hours as needed for Wheezing      montelukast (SINGULAIR) 10 MG tablet Take 10 mg by mouth nightly      clonazePAM (KLONOPIN) 0.5 MG tablet Take 0.5 mg by mouth daily as needed.  ibuprofen (ADVIL;MOTRIN) 200 MG tablet Take 200 mg by mouth every 6 hours as needed for Pain      Naproxen Sodium (ALEVE) 220 MG CAPS Take by mouth as needed for Pain      Wound Dressings (MEDIHONEY WOUND/BURN DRESSING) GEL gel Apply 1 each topically daily 1 Tube 0     No current facility-administered medications on file prior to visit. Pertinent items are noted in HPI  Review of systems reviewed from Patient History Form dated on 10/7/2020 and available in the patient's chart under the Media tab. No change noted. PHYSICAL EXAMINATION:  Ms. Harmony Thayer is a very pleasant 44 y.o.  female who presents today in no acute distress, awake, alert, and oriented. She is well dressed, nourished and  groomed. Patient with normal affect. Height is  5' 6\" (1.676 m), weight is 190 lb (86.2 kg), Body mass index is 30.67 kg/m². Resting respiratory rate is 16. Examination of the gait, showed that the patient walks with no limp . Examination of both upper extremities showing a decreased range of motion of the left long (middle) finger compare to the other side. There is mild swelling that can be seen, as well as ecchymosis of the long (middle) finger with necrotic tissue at the tuft. Tuft of the long finger with a laceration that is sutured with open wound. Sutures removed today. She has intact sensation and good radial pulses. She has mild tenderness on deep palpation over the distal phalanx of the long (middle) finger left hand. There is no rotational deformity of the left long (middle) finger. IMAGING: Carina Su were reviewed, taken 10/14/2020 in the office,  3 views of the left hand, and showed a long (middle) finger distal phalanx minimally displaced fracture. IMPRESSION: Left hand long (middle) finger distal phalanx minimally displaced open fracture.     PLAN:  I discussed that the overall alignment of this fracture is good and that we can try to treat this non-operatively in a soft dressing. We discussed the risk of nonunion and or malunion. Sutures removed today and tolerated well. We will see her  back in 2 weeks at which time we will do wound check and repeat Xray.         Toney Yang MD

## 2020-11-06 ENCOUNTER — OFFICE VISIT (OUTPATIENT)
Dept: ORTHOPEDIC SURGERY | Age: 50
End: 2020-11-06

## 2020-11-06 VITALS — BODY MASS INDEX: 30.53 KG/M2 | WEIGHT: 190 LBS | TEMPERATURE: 97.4 F | HEIGHT: 66 IN

## 2020-11-06 PROCEDURE — 99024 POSTOP FOLLOW-UP VISIT: CPT | Performed by: ORTHOPAEDIC SURGERY

## 2020-11-06 PROCEDURE — G8484 FLU IMMUNIZE NO ADMIN: HCPCS | Performed by: ORTHOPAEDIC SURGERY

## 2020-11-06 PROCEDURE — 3017F COLORECTAL CA SCREEN DOC REV: CPT | Performed by: ORTHOPAEDIC SURGERY

## 2020-11-06 PROCEDURE — 1036F TOBACCO NON-USER: CPT | Performed by: ORTHOPAEDIC SURGERY

## 2020-11-06 PROCEDURE — G8417 CALC BMI ABV UP PARAM F/U: HCPCS | Performed by: ORTHOPAEDIC SURGERY

## 2020-11-06 PROCEDURE — G8428 CUR MEDS NOT DOCUMENT: HCPCS | Performed by: ORTHOPAEDIC SURGERY

## 2020-11-06 NOTE — PROGRESS NOTES
CHIEF COMPLAINT: Left hand pain/ long (middle) finger distal phalanx open fracture. DATE OF INJURY: 10/6/2020    HISTORY:  Ms. Ronnell Gallo 44 y.o.  female left handed presents today for f/u evaluation of a left hand injury which occurred when she was getting a friends bushes with a hedge tremors and accidentally cut her finger. She is complaining of long (middle) finger pain and swelling that is improving. This is better with elevation and worse with ROM. The pain is tender when touching it and throbbs and not radiating. No other complaint. She rates her pain a 3/10 VAS. She was seen at [facility] ER, was evaluated, started on Keflex, sutured and splinted and asked to see orthopedics. Denies smoking.     Past Medical History:   Diagnosis Date    Anxiety     Arthritis     Asthma     Depression     [facility] of lower extremity (deep venous thrombosis) [facility]) 14 Nov 2013    Fibromyalgia     Fibromyalgia     Gastroesophageal reflux 2004    History of migraine headaches     Hx of blood clots     Low back pain     Neck pain     Thyroid disease     Hashimoto's Disease       Past Surgical History:   Procedure Laterality Date    ANTERIOR CRUCIATE LIGAMENT REPAIR  2013    CERVICAL [address] SURGERY      CHOLECYSTECTOMY  2005    CYST REMOVAL      EYE SURGERY  EYELID LIFT    HYSTERECTOMY  2011    KNEE ARTHROSCOPY Left [id number]     ARTHROSCOPIC LEFT KNEE ANTERIOR CRUCIATE LIGAMENT    SHOULDER ARTHROSCOPY Left FROZEN    SHOULDER SURGERY         Social History     Socioeconomic History    Marital status:      Spouse name: Not on file    Number of children: 1    Years of education: Not on file    Highest education level: Not on file   Occupational History    Not on file   Social Needs    Financial resource strain: Not on file    Food insecurity     Worry: Not on file     Inability: Not on file    Transportation needs     Medical: Not on file     Non-medical: Not on file   Tobacco Use    Smoking status: Never Smoker    Smokeless tobacco: Never Used   Substance and Sexual Activity    Alcohol use:  Yes     Alcohol/week: 3.0 standard drinks     Types: 3 Cans of beer per week     Comment: RARELY    Drug use: No    Sexual activity: Not on file   Lifestyle    Physical activity     Days per week: Not on file     Minutes per session: Not on file    Stress: Not on file   Relationships    Social connections     Talks on phone: Not on file     Gets together: Not on file     Attends Gnosticist service: Not on file     Active member of club or organization: Not on file     Attends meetings of clubs or organizations: Not on file     Relationship status: Not on file    Intimate partner violence     Fear of current or ex partner: Not on file     Emotionally abused: Not on file     Physically abused: Not on file     Forced sexual activity: Not on file   Other Topics Concern    Not on file   Social History Narrative    Not on file       Family History   Problem Relation Age of Onset    Diabetes Father     Osteoarthritis Father     High Blood Pressure Father     High Cholesterol Father     Coronary Art Dis Father     Cancer Father         liver    Depression Father     Arthritis Father     Osteoarthritis Mother     Diabetes Mother     Depression Mother     Arthritis Mother     Seizures Other         Son    Diabetes Maternal Grandmother     Coronary Art Dis Maternal Grandfather     Diabetes Maternal Grandfather        Current Outpatient Medications on File Prior to Visit   Medication Sig Dispense Refill    Pseudoephedrine HCl 30 MG/5ML LIQD Take by mouth      FLUoxetine (PROZAC) 20 MG capsule Take 20 mg by mouth daily 2 capsules      albuterol sulfate  (90 Base) MCG/ACT inhaler Inhale 2 puffs into the lungs every 6 hours as needed for Wheezing      montelukast (SINGULAIR) 10 MG tablet Take 10 mg by mouth nightly      clonazePAM (KLONOPIN) 0.5 MG tablet Take 0.5 mg by mouth daily as dressing. We discussed the risk of nonunion and or malunion. Sutures removed today and tolerated well. We will see her  back in 4 weeks at which time we will do wound check and repeat Xray.         Danyell Linda MD

## 2020-12-11 ENCOUNTER — OFFICE VISIT (OUTPATIENT)
Dept: ORTHOPEDIC SURGERY | Age: 50
End: 2020-12-11

## 2020-12-11 VITALS — TEMPERATURE: 97.3 F | BODY MASS INDEX: 30.53 KG/M2 | WEIGHT: 190 LBS | HEIGHT: 66 IN

## 2020-12-11 PROCEDURE — 99024 POSTOP FOLLOW-UP VISIT: CPT | Performed by: ORTHOPAEDIC SURGERY

## 2020-12-11 NOTE — PROGRESS NOTES
Tobacco Use    Smoking status: Never Smoker    Smokeless tobacco: Never Used   Substance and Sexual Activity    Alcohol use:  Yes     Alcohol/week: 3.0 standard drinks     Types: 3 Cans of beer per week     Comment: RARELY    Drug use: No    Sexual activity: Not on file   Lifestyle    Physical activity     Days per week: Not on file     Minutes per session: Not on file    Stress: Not on file   Relationships    Social connections     Talks on phone: Not on file     Gets together: Not on file     Attends Buddhist service: Not on file     Active member of club or organization: Not on file     Attends meetings of clubs or organizations: Not on file     Relationship status: Not on file    Intimate partner violence     Fear of current or ex partner: Not on file     Emotionally abused: Not on file     Physically abused: Not on file     Forced sexual activity: Not on file   Other Topics Concern    Not on file   Social History Narrative    Not on file       Family History   Problem Relation Age of Onset    Diabetes Father     Osteoarthritis Father     High Blood Pressure Father     High Cholesterol Father     Coronary Art Dis Father     Cancer Father         liver    Depression Father     Arthritis Father     Osteoarthritis Mother     Diabetes Mother     Depression Mother     Arthritis Mother     Seizures Other         Son    Diabetes Maternal Grandmother     Coronary Art Dis Maternal Grandfather     Diabetes Maternal Grandfather        Current Outpatient Medications on File Prior to Visit   Medication Sig Dispense Refill    Pseudoephedrine HCl 30 MG/5ML LIQD Take by mouth      FLUoxetine (PROZAC) 20 MG capsule Take 20 mg by mouth daily 2 capsules      albuterol sulfate  (90 Base) MCG/ACT inhaler Inhale 2 puffs into the lungs every 6 hours as needed for Wheezing      montelukast (SINGULAIR) 10 MG tablet Take 10 mg by mouth nightly      clonazePAM (KLONOPIN) 0.5 MG tablet Take 0.5 mg by mouth daily as needed.  ibuprofen (ADVIL;MOTRIN) 200 MG tablet Take 200 mg by mouth every 6 hours as needed for Pain      Naproxen Sodium (ALEVE) 220 MG CAPS Take by mouth as needed for Pain      Wound Dressings (MEDIHONEY WOUND/BURN DRESSING) GEL gel Apply 1 each topically daily 1 Tube 0     No current facility-administered medications on file prior to visit. Pertinent items are noted in HPI  Review of systems reviewed from Patient History Form dated on 10/7/2020 and available in the patient's chart under the Media tab. No change noted. PHYSICAL EXAMINATION:  Ms. Jessica Pulido is a very pleasant 44 y.o.  female who presents today in no acute distress, awake, alert, and oriented. She is well dressed, nourished and  groomed. Patient with normal affect. Height is  5' 6\" (1.676 m), weight is 190 lb (86.2 kg), Body mass index is 30.67 kg/m². Resting respiratory rate is 16. Examination of the gait, showed that the patient walks with no limp. Examination of both upper extremities showing a decreased range of motion of the left long (middle) finger compare to the other side. There is mild swelling that can be seen, no ecchymosis of the long (middle) finger wound healing well. She has intact sensation and good radial pulses. She has mild tenderness on deep palpation over the distal phalanx of the long (middle) finger left hand. There is no rotational deformity of the left long (middle) finger. She has hyperextension all of her fingers but the long finger is more. IMAGING: Joce Russell were reviewed, taken today in the office,  2 views of the left long finger, and showed a long (middle) finger distal phalanx minimally displaced fracture. IMPRESSION: Left hand long (middle) finger distal phalanx minimally displaced open fracture. PLAN:  I discussed that the overall alignment of this fracture is good and that we can try to treat this non-operatively in a soft dressing.   We discussed the risk of nonunion and or malunion. Sutures removed today and tolerated well. We will see her  back in 4 weeks at which time we will do wound check and repeat Xray.         Elsie Ortiz MD

## 2021-01-08 ENCOUNTER — OFFICE VISIT (OUTPATIENT)
Dept: ORTHOPEDIC SURGERY | Age: 51
End: 2021-01-08
Payer: COMMERCIAL

## 2021-01-08 VITALS — TEMPERATURE: 97.2 F | HEIGHT: 66 IN | WEIGHT: 190 LBS | BODY MASS INDEX: 30.53 KG/M2

## 2021-01-08 DIAGNOSIS — S62.633B OPEN DISPLACED FRACTURE OF DISTAL PHALANX OF LEFT MIDDLE FINGER, INITIAL ENCOUNTER: Primary | ICD-10-CM

## 2021-01-08 PROCEDURE — 99213 OFFICE O/P EST LOW 20 MIN: CPT | Performed by: ORTHOPAEDIC SURGERY

## 2021-01-08 PROCEDURE — 3017F COLORECTAL CA SCREEN DOC REV: CPT | Performed by: ORTHOPAEDIC SURGERY

## 2021-01-08 PROCEDURE — G8484 FLU IMMUNIZE NO ADMIN: HCPCS | Performed by: ORTHOPAEDIC SURGERY

## 2021-01-08 PROCEDURE — G8417 CALC BMI ABV UP PARAM F/U: HCPCS | Performed by: ORTHOPAEDIC SURGERY

## 2021-01-08 PROCEDURE — 1036F TOBACCO NON-USER: CPT | Performed by: ORTHOPAEDIC SURGERY

## 2021-01-08 PROCEDURE — G8427 DOCREV CUR MEDS BY ELIG CLIN: HCPCS | Performed by: ORTHOPAEDIC SURGERY

## 2021-01-08 NOTE — PROGRESS NOTES
CHIEF COMPLAINT: Left hand pain/ long (middle) finger distal phalanx open fracture. DATE OF INJURY: 10/6/2020    HISTORY:  Ms. Linda Sands 44 y.o.  female left handed presents today for f/u evaluation of a left hand injury which occurred when she was getting a friends bushes with a hedge trimmers and accidentally cut her finger. She is complaining of long (middle) finger pain and swelling that is improving. This is better with elevation and worse with ROM. The pain is tender when touching it, but is not radiating. She states that intermittently she develops leg a muro type area ruptures on the distal part of her long finger. There is pain when this happens. No other complaint. She rates her pain a 2/10 VAS and improving. She was seen at [facility] ER, was evaluated, started on Keflex, sutured and splinted and asked to see orthopedics. Denies smoking. No ABX at this point. She normally has hypermobility of her fingers and joints.     Past Medical History:   Diagnosis Date    Anxiety     Arthritis     Asthma     Depression     DVT of lower extremity (deep venous thrombosis) [facility]) 14 Nov 2013    Fibromyalgia     Fibromyalgia     Gastroesophageal reflux 2004    History of migraine headaches     Hx of blood clots     Low back pain     Neck pain     Thyroid disease     Hashimoto's Disease       Past Surgical History:   Procedure Laterality Date    ANTERIOR CRUCIATE LIGAMENT REPAIR  2013    CERVICAL [address] SURGERY      CHOLECYSTECTOMY  2005    CYST REMOVAL      EYE SURGERY  EYELID LIFT    HYSTERECTOMY  2011    KNEE ARTHROSCOPY Left [id number]     ARTHROSCOPIC LEFT KNEE ANTERIOR CRUCIATE LIGAMENT    SHOULDER ARTHROSCOPY Left FROZEN    SHOULDER SURGERY         Social History     Socioeconomic History    Marital status:      Spouse name: Not on file    Number of children: 1    Years of education: Not on file    Highest education level: Not on file   Occupational History    Not on file   Social Needs    Financial resource strain: Not on file    Food insecurity     Worry: Not on file     Inability: Not on file    Transportation needs     Medical: Not on file     Non-medical: Not on file   Tobacco Use    Smoking status: Never Smoker    Smokeless tobacco: Never Used   Substance and Sexual Activity    Alcohol use:  Yes     Alcohol/week: 3.0 standard drinks     Types: 3 Cans of beer per week     Comment: RARELY    Drug use: No    Sexual activity: Not on file   Lifestyle    Physical activity     Days per week: Not on file     Minutes per session: Not on file    Stress: Not on file   Relationships    Social connections     Talks on phone: Not on file     Gets together: Not on file     Attends Worship service: Not on file     Active member of club or organization: Not on file     Attends meetings of clubs or organizations: Not on file     Relationship status: Not on file    Intimate partner violence     Fear of current or ex partner: Not on file     Emotionally abused: Not on file     Physically abused: Not on file     Forced sexual activity: Not on file   Other Topics Concern    Not on file   Social History Narrative    Not on file       Family History   Problem Relation Age of Onset    Diabetes Father     Osteoarthritis Father     High Blood Pressure Father     High Cholesterol Father     Coronary Art Dis Father     Cancer Father         liver    Depression Father     Arthritis Father     Osteoarthritis Mother     Diabetes Mother     Depression Mother     Arthritis Mother     Seizures Other         Son    Diabetes Maternal Grandmother     Coronary Art Dis Maternal Grandfather     Diabetes Maternal Grandfather        Current Outpatient Medications on File Prior to Visit   Medication Sig Dispense Refill    Pseudoephedrine HCl 30 MG/5ML LIQD Take by mouth      FLUoxetine (PROZAC) 20 MG capsule Take 20 mg by mouth daily 2 capsules      albuterol sulfate  well.    IMPRESSION: Left hand long (middle) finger distal phalanx minimally displaced open fracture. PLAN:  I discussed that the overall alignment of this fracture is good. She was instructed to work on range of motion and she would like to do this on her own. She can go back to normal activity with no restrictions. She will be seen PRN. I told the patient that it is not unusual to have some achy pain and swelling for up to a year after a fracture.       Hector Staley MD

## 2022-04-05 ENCOUNTER — HOSPITAL ENCOUNTER (OUTPATIENT)
Dept: WOMENS IMAGING | Age: 52
Discharge: HOME | End: 2022-04-05
Payer: COMMERCIAL

## 2022-04-05 DIAGNOSIS — Z12.31 BREAST CANCER SCREENING BY MAMMOGRAM: ICD-10-CM

## 2022-04-05 PROCEDURE — 77063 BREAST TOMOSYNTHESIS BI: CPT
